# Patient Record
Sex: MALE | Race: BLACK OR AFRICAN AMERICAN | Employment: STUDENT | URBAN - NONMETROPOLITAN AREA
[De-identification: names, ages, dates, MRNs, and addresses within clinical notes are randomized per-mention and may not be internally consistent; named-entity substitution may affect disease eponyms.]

---

## 2017-01-27 ENCOUNTER — HOSPITAL ENCOUNTER (OUTPATIENT)
Dept: PHYSICAL THERAPY | Age: 22
Discharge: HOME | End: 2017-01-27
Admitting: ORTHOPAEDIC SURGERY

## 2017-01-30 ENCOUNTER — OFFICE VISIT (OUTPATIENT)
Dept: ORTHOPEDIC SURGERY | Age: 22
End: 2017-01-30

## 2017-01-30 VITALS
BODY MASS INDEX: 23.25 KG/M2 | SYSTOLIC BLOOD PRESSURE: 138 MMHG | HEIGHT: 75 IN | DIASTOLIC BLOOD PRESSURE: 81 MMHG | WEIGHT: 187 LBS | HEART RATE: 71 BPM

## 2017-01-30 DIAGNOSIS — Z98.890 STATUS POST HIP SURGERY: Primary | ICD-10-CM

## 2017-01-30 PROCEDURE — 99213 OFFICE O/P EST LOW 20 MIN: CPT | Performed by: ORTHOPAEDIC SURGERY

## 2018-03-20 ENCOUNTER — TELEPHONE (OUTPATIENT)
Dept: ORTHOPEDIC SURGERY | Age: 23
End: 2018-03-20

## 2018-12-31 ENCOUNTER — OFFICE VISIT (OUTPATIENT)
Dept: ORTHOPEDIC SURGERY | Age: 23
End: 2018-12-31
Payer: COMMERCIAL

## 2018-12-31 ENCOUNTER — TELEPHONE (OUTPATIENT)
Dept: ORTHOPEDIC SURGERY | Age: 23
End: 2018-12-31

## 2018-12-31 VITALS
HEIGHT: 75 IN | HEART RATE: 65 BPM | DIASTOLIC BLOOD PRESSURE: 75 MMHG | BODY MASS INDEX: 23.25 KG/M2 | WEIGHT: 187 LBS | SYSTOLIC BLOOD PRESSURE: 139 MMHG

## 2018-12-31 DIAGNOSIS — M76.892 HIP FLEXOR TENDINITIS, LEFT: ICD-10-CM

## 2018-12-31 DIAGNOSIS — M25.552 LEFT HIP PAIN: Primary | ICD-10-CM

## 2018-12-31 PROCEDURE — 99213 OFFICE O/P EST LOW 20 MIN: CPT | Performed by: ORTHOPAEDIC SURGERY

## 2019-03-05 ENCOUNTER — TELEPHONE (OUTPATIENT)
Dept: PRIMARY CARE CLINIC | Age: 24
End: 2019-03-05

## 2024-06-07 ENCOUNTER — EMERGENCY (EMERGENCY)
Facility: HOSPITAL | Age: 29
LOS: 1 days | Discharge: HOME | End: 2024-06-07
Attending: EMERGENCY MEDICINE | Admitting: EMERGENCY MEDICINE
Payer: COMMERCIAL

## 2024-06-07 VITALS
TEMPERATURE: 99 F | HEIGHT: 74 IN | RESPIRATION RATE: 16 BRPM | OXYGEN SATURATION: 100 % | DIASTOLIC BLOOD PRESSURE: 73 MMHG | SYSTOLIC BLOOD PRESSURE: 134 MMHG | HEART RATE: 84 BPM | WEIGHT: 190.04 LBS
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
## 2024-06-07 DIAGNOSIS — S86.011A STRAIN OF RIGHT ACHILLES TENDON, INITIAL ENCOUNTER: ICD-10-CM

## 2024-06-07 DIAGNOSIS — M25.571 PAIN IN RIGHT ANKLE AND JOINTS OF RIGHT FOOT: ICD-10-CM
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
## 2024-06-07 PROCEDURE — 29515 APPLICATION SHORT LEG SPLINT: CPT | Mod: RT

## 2024-06-07 PROCEDURE — 99284 EMERGENCY DEPT VISIT MOD MDM: CPT | Mod: 25

## 2024-06-07 RX ORDER — IBUPROFEN 200 MG
600 TABLET ORAL ONCE
Refills: 0 | Status: COMPLETED | OUTPATIENT
Start: 2024-06-07 | End: 2024-06-07

## 2024-06-07 RX ADMIN — Medication 600 MILLIGRAM(S): at 23:41

## 2024-06-07 NOTE — ED ADULT TRIAGE NOTE - CHIEF COMPLAINT QUOTE
pt c/o of right ankle pain x 1 hour. was playing football when he heard a "pop" unable to bare weight, limited ROM.

## 2024-06-07 NOTE — ED ADULT NURSE NOTE - BREATHING, MLM
Addended by: JULIO MORTON on: 5/11/2022 02:44 PM     Modules accepted: Orders     Spontaneous, unlabored and symmetrical

## 2024-06-07 NOTE — ED ADULT NURSE NOTE - NSFALLRISKINTERV_ED_ALL_ED
Assistance OOB with selected safe patient handling equipment if applicable/Assistance with ambulation/Communicate fall risk and risk factors to all staff, patient, and family/Monitor gait and stability/Provide visual cue: yellow wristband, yellow gown, etc/Reinforce activity limits and safety measures with patient and family/Call bell, personal items and telephone in reach/Instruct patient to call for assistance before getting out of bed/chair/stretcher/Non-slip footwear applied when patient is off stretcher/[place] to call system/Physically safe environment - no spills, clutter or unnecessary equipment/Purposeful Proactive Rounding/Room/bathroom lighting operational, light cord in reach

## 2024-06-07 NOTE — ED ADULT NURSE NOTE - OBJECTIVE STATEMENT
Patient presents with c/o right ankle pain and unable to bear weight s/p feeling a pop while playing football.

## 2024-06-08 NOTE — ED PROVIDER NOTE - CLINICAL SUMMARY MEDICAL DECISION MAKING FREE TEXT BOX
Odontoid fracture, sequela
28-year-old male no past medical history presents with right posterior ankle pain at the site of the Achilles tendon.  Patient was playing flag football and went into a sprint from standing and heard a pop behind the ankle.  Patient unable to bear weight since then.  Patient denies any other injuries.  Denies numbness paresthesias or weakness to right lower extremity.  Denies prior Achilles tendon ruptures or surgeries to right lower extremity    Patient with positive Pond's test exam is consistent with an Achilles tendon rupture.  I discussed clinical findings with Dr. Lambert on-call for orthopedics.  Patient was offered surgery and admission now or outpatient follow-up.  Patient is electing to follow-up with orthopedics outpatient, will be placing in a splint with plantarflexion, and will be given crutches.  Nonweightbearing discussed with patient.  Pain control with Tylenol or ibuprofen.  Patient has no other distracting injuries.  Otherwise has stable vital signs.  I do not suspect fracture of the ankle as his ankle is intact nontender.

## 2024-06-08 NOTE — ED PROVIDER NOTE - NSFOLLOWUPINSTRUCTIONS_ED_ALL_ED_FT
Achilles Tendon Tear  The foot and ankle showing a tear in the Achilles tendon.  Tendons are tissues that connect muscle to bone. The Achilles tendon connects the muscles of your lower leg (calf) to your heel. It is the most common tendon to tear.    What are the causes?  An Achilles tendon tear may be caused by:  Sudden stretching of the tendon. This may happen when you land from a jump or if your heel drops down into a hole on uneven ground.  A hard, direct hit.  Using your foot to push off with force. This may happen if you sprint, jump, or change direction while running.  What increases the risk?  You are more likely to get an Achilles tendon tear if:  You are a runner.  You play contact sports or sports that are stop and go, such as basketball, tennis, or soccer.  You have a weak Achilles tendon. This may be from age, repeat injuries, or long-term (chronic) inflammation of the tendon (tendinitis).  You are male.  You are 30–55 years of age.  You take a type of medicine called fluoroquinolones. These include levofloxacin and ciprofloxacin.  You take steroid medicines.  What are the signs or symptoms?  Symptoms of an Achilles tendon tear include:  Hearing a pop or snap at the time of injury.  Severe, sudden pain in the back of your ankle or calf.  Swelling and bruising.  Being unable to point your toes down.  Pain when you stand or walk.  A feeling of less strength when you step on your foot.  How is this diagnosed?  In most cases, an Achilles tendon tear is diagnosed based on a physical exam. You may also have imaging tests. These may include:  Ultrasound.  X-rays.  MRI.  How is this treated?  An Achilles tendon tear may be treated with:  Rest.  Ice.  Medicine.  Crutches.  A cast, splint, or other device to keep the ankle from moving (immobilizer).  Heel wedges. These can reduce the stretch on your tendon as it heals.  Surgery.  Follow these instructions at home:  Medicines    Take over-the-counter and prescription medicines only as told by your health care provider.  Ask your provider if the medicine prescribed to you:  Requires you to avoid driving or using machinery.  Can cause constipation. You may need to take these actions to prevent or treat constipation:  Drink enough fluid to keep your pee (urine) pale yellow.  Take over-the-counter or prescription medicines.  Eat foods that are high in fiber, such as beans, whole grains, and fresh fruits and vegetables.  Limit foods that are high in fat and processed sugars, such as fried or sweet foods.  If you have a nonremovable cast or splint:    Do not put pressure on any part of the cast or splint until it is fully hardened. This may take several hours.  Do not stick anything inside the cast or splint to scratch your skin. Doing that increases your risk of infection.  Check the skin around the cast or splint every day. Tell your provider about any concerns.  You may put lotion on dry skin around the edges of the cast or splint. Do not put lotion on the skin underneath the cast or splint.  Keep the cast or splint clean and dry.  If you have a removable splint or brace:    Wear the splint or brace as told by your provider. Remove it only as told by your provider.  Check the skin around the splint or brace every day. Tell your provider about any concerns.  Loosen the splint or brace if your toes tingle, become numb, or turn cold and blue.  Keep the splint or brace clean and dry.  Bathing    Do not take baths, swim, or use a hot tub until your provider approves. Ask your provider if you may take showers. You may only be allowed to take sponge baths.  If the cast, splint, or brace is not waterproof:  Do not let it get wet.  Cover it with a watertight covering when you take a bath or shower.  Managing pain, stiffness, and swelling    Bag of ice on a towel on the skin.  If told, put ice on the injured area.  If you have a removable splint or brace, remove it as told by your provider.  Put ice in a plastic bag.  Place a towel between your skin and the bag or between your cast and the bag.  Leave the ice on for 20 minutes, 2–3 times a day.  If your skin turns bright red, remove the ice right away to prevent skin damage. The risk of damage is higher if you cannot feel pain, heat, or cold.  Move your toes often to reduce stiffness and swelling.  Raise (elevate) your leg above the level of your heart while you are sitting or lying down.  Activity    Do not use the injured leg to support your body weight until your provider says that you can. Use crutches, a rolling walker, or a knee scooter as told by your provider.  Ask your provider when it is safe to drive if you have a cast, splint, or brace on your leg.  Do exercises as told by your provider.  Return to your normal activities as told by your provider. Ask your provider what activities are safe for you.  General instructions    Do not use any products that contain nicotine or tobacco. These products include cigarettes, chewing tobacco, and vaping devices, such as e-cigarettes. If you need help quitting, ask your provider.  Use heel wedges if told by your provider.  Keep all follow-up visits. Your provider will check how your tendon is healing and adjust treatments as needed.  How is this prevented?  Stretch before and after being active.  Rest between periods of activity.  Use equipment that fits you.  Contact a health care provider if:  You have more pain and swelling.  Your pain does not get better with medicine.  You have new or worse symptoms.  You have trouble moving your toes or foot.  Your foot feels warm.  You have a fever.  This information is not intended to replace advice given to you by your health care provider. Make sure you discuss any questions you have with your health care provider.

## 2024-06-08 NOTE — ED PROVIDER NOTE - PROGRESS NOTE DETAILS
Discussed case with Dr. Lambert orthopedics on-call will see patient as an outpatient.  Patient given option of admission for repair following day.  Patient is electing to do outpatient follow-up with elective outpatient ambulatory repair

## 2024-06-08 NOTE — ED PROVIDER NOTE - PATIENT PORTAL LINK FT
You can access the FollowMyHealth Patient Portal offered by [facility] by registering at the following website: [email or web address]. By joining [employer]’s FollowMyHealth portal, you will also be able to view your health information using other applications (apps) compatible with our system.

## 2024-06-08 NOTE — ED PROVIDER NOTE - LOWER EXTREMITY EXAM, RIGHT
There is palpable deformity over the Achilles tendon over right lower extremity, there is tenderness to palpation proximal to Achilles tendon insertion.  There is positive Pond's test with a deficit in plantarflexion.  Ankle is otherwise intact no deformities nontender no edema.  Distal +2 DP full dorsiflexion intact

## 2024-06-08 NOTE — ED PROVIDER NOTE - OBJECTIVE STATEMENT
28-year-old male no past medical history presents with right posterior ankle pain at the site of the Achilles tendon.  Patient was playing flag football and went into a sprint from standing and heard a pop behind the ankle.  Patient unable to bear weight since then.  Patient denies any other injuries.  Denies numbness paresthesias or weakness to right lower extremity.  Denies prior Achilles tendon ruptures or surgeries to right lower extremity

## 2024-06-08 NOTE — ED PROVIDER NOTE - CARE PROVIDER_API CALL
Champ Lambert  Orthopaedic Surgery  [address] [place] [address], [address]  [place] [address]  Phone: [phone]  Fax: [phone]  Follow Up Time:

## 2024-06-18 NOTE — ASU PATIENT PROFILE, ADULT - FALL HARM RISK - HARM RISK INTERVENTIONS

## 2024-06-18 NOTE — ASU PATIENT PROFILE, ADULT - HISTORY OF COVID-19 VACCINATION
Detail Level: Detailed Quality 131: Pain Assessment And Follow-Up: Pain assessment using a standardized tool is documented as negative, no follow-up plan required Quality 130: Documentation Of Current Medications In The Medical Record: Current Medications Documented Quality 474: Zoster Vaccination Status: Shingrix vaccination previously received Yes

## 2024-06-18 NOTE — ASU PATIENT PROFILE, ADULT - NS PREOP UNDERSTANDS INFO
Spoke to patient to be NPO/No solid foods after  12Mn, allow to drink water or apple juice  till 12-4 am , dress  comfortable, leave all valuable at home, Bring  Id photo and insurance cards , escort arranged , address and   telephone  ./yes

## 2024-06-18 NOTE — ASU PATIENT PROFILE, ADULT - NSICDXPASTSURGICALHX_GEN_ALL_CORE_FT
PAST SURGICAL HISTORY:  Deviated septum     H/O arthroscopy of hip     History of hip surgery torn labrum    History of tonsillectomy      PAST SURGICAL HISTORY:  Deviated septum     H/O arthroscopy of hip     History of hip surgery torn labrum    History of tonsillectomy     [place] teeth removed

## 2024-06-19 ENCOUNTER — OUTPATIENT (OUTPATIENT)
Dept: OUTPATIENT SERVICES | Facility: HOSPITAL | Age: 29
LOS: 1 days | Discharge: HOME | End: 2024-06-19

## 2024-06-19 VITALS
OXYGEN SATURATION: 100 % | TEMPERATURE: 99 F | SYSTOLIC BLOOD PRESSURE: 145 MMHG | WEIGHT: 196.21 LBS | DIASTOLIC BLOOD PRESSURE: 78 MMHG | HEIGHT: 74 IN | RESPIRATION RATE: 16 BRPM | HEART RATE: 74 BPM

## 2024-06-19 VITALS
RESPIRATION RATE: 16 BRPM | OXYGEN SATURATION: 100 % | TEMPERATURE: 98 F | DIASTOLIC BLOOD PRESSURE: 70 MMHG | HEART RATE: 54 BPM | SYSTOLIC BLOOD PRESSURE: 124 MMHG

## 2024-06-19 DIAGNOSIS — J34.2 DEVIATED NASAL SEPTUM: Chronic | ICD-10-CM

## 2024-06-19 DIAGNOSIS — Z90.89 ACQUIRED ABSENCE OF OTHER ORGANS: Chronic | ICD-10-CM

## 2024-06-19 DIAGNOSIS — K08.409 PARTIAL LOSS OF TEETH, UNSPECIFIED CAUSE, UNSPECIFIED CLASS: Chronic | ICD-10-CM

## 2024-06-19 DIAGNOSIS — Z98.890 OTHER SPECIFIED POSTPROCEDURAL STATES: Chronic | ICD-10-CM

## 2024-06-19 PROCEDURE — 27650 REPAIR ACHILLES TENDON: CPT | Mod: AS

## 2024-06-19 DEVICE — IMP SYS MIDSUBSTANCE ACHILLES SPEEDBRIDGE: Type: IMPLANTABLE DEVICE | Site: RIGHT | Status: FUNCTIONAL

## 2024-06-19 RX ORDER — ONDANSETRON 8 MG/1
4 TABLET, FILM COATED ORAL ONCE
Refills: 0 | Status: DISCONTINUED | OUTPATIENT
Start: 2024-06-19 | End: 2024-06-19

## 2024-06-19 RX ORDER — OXYCODONE HYDROCHLORIDE 5 MG/1
5 TABLET ORAL ONCE
Refills: 0 | Status: DISCONTINUED | OUTPATIENT
Start: 2024-06-19 | End: 2024-06-19

## 2024-06-19 RX ORDER — SODIUM CHLORIDE 9 MG/ML
1000 INJECTION, SOLUTION INTRAVENOUS
Refills: 0 | Status: DISCONTINUED | OUTPATIENT
Start: 2024-06-19 | End: 2024-06-19

## 2024-06-19 RX ADMIN — SODIUM CHLORIDE 100 MILLILITER(S): 9 INJECTION, SOLUTION INTRAVENOUS at 14:55

## 2024-06-19 NOTE — ASU DISCHARGE PLAN (ADULT/PEDIATRIC) - ASU DC SPECIAL INSTRUCTIONSFT
** see pre printed post op instruction sheets for full post op instructions**  Wear boot at all times except when showering  Non weight bearing right ankle with crutches   Keep dressing dry and intact until follow up appointment  Use ice frequently for no more than 20 minutes at a time  Please  Rx for pain meds, antibiotic, and take baby aspirin daily  Make an appointment for physical therapy as soon as possible and please call the doctor's office as soon as possible for follow up appointment next week. ** see pre printed post op instruction sheets for full post op instructions**  Wear splint at all times   Non weight bearing right lower extremity with crutches   Keep dressing dry and intact until follow up appointment  Use ice frequently for no more than 20 minutes at a time  Please  Rx for pain meds, antibiotic, and take baby aspirin daily  Make an appointment for physical therapy as soon as possible and please call the doctor's office as soon as possible for follow up appointment next week.

## 2024-06-19 NOTE — ASU PREOP CHECKLIST - LATEX ALLERGY
Unfortunately I am unable to see her previous results.     My recommendations is to repeat another CMP in 30 days to establish a trend, if they are still elevated, we can order an US liver and then follow up with hepatology if necessary. I have placed a CMP order.   no

## 2024-06-19 NOTE — PRE-ANESTHESIA EVALUATION ADULT - NSANTHOSAYNRD_GEN_A_CORE
No. LIZZ screening performed.  STOP BANG Legend: 0-2 = LOW Risk; 3-4 = INTERMEDIATE Risk; 5-8 = HIGH Risk

## (undated) DEVICE — SYR LUER LOK 10CC

## (undated) DEVICE — DRSG ACE BANDAGE 6"

## (undated) DEVICE — Device

## (undated) DEVICE — PACK ORTHO FOOT ANKLE

## (undated) DEVICE — PREP CHLORAPREP HI-LITE ORANGE 26ML

## (undated) DEVICE — SUT NYLON 3-0 18" PS-2

## (undated) DEVICE — DRAPE IOBAN 23" X 23"

## (undated) DEVICE — WARMING BLANKET UPPER ADULT

## (undated) DEVICE — GOWN SLEEVES

## (undated) DEVICE — SUT VICRYL PLUS 2-0 27" SH UNDYED

## (undated) DEVICE — ELCTR GROUNDING PAD ADULT COVIDIEN

## (undated) DEVICE — SUT MONOCRYL 3-0 27" PS-1 UNDYED

## (undated) DEVICE — SUT NDL MARTIN UTERINE 1/2 CIRCLE REVERSE CUTTING 0.050" X 1.677"

## (undated) DEVICE — DRSG WEBRIL 6"

## (undated) DEVICE — NDL HYPO SAFE 18G X 1.5" (PINK)

## (undated) DEVICE — ARTHREX BIOTENODESIS KIT DISP

## (undated) DEVICE — DRSG STOCKINETTE IMPERVIOUS XL

## (undated) DEVICE — DRAPE TOP SHEET 53" X 101"

## (undated) DEVICE — SUT VICRYL PLUS 1 26" CT-1 UNDYED

## (undated) DEVICE — TOURNIQUET CUFF 34" DUAL PORT W PLC

## (undated) DEVICE — VENODYNE/SCD SLEEVE CALF MEDIUM

## (undated) DEVICE — SUT VICRYL PLUS 3-0 27" SH UNDYED